# Patient Record
Sex: FEMALE | Race: ASIAN | NOT HISPANIC OR LATINO | Employment: FULL TIME | URBAN - METROPOLITAN AREA
[De-identification: names, ages, dates, MRNs, and addresses within clinical notes are randomized per-mention and may not be internally consistent; named-entity substitution may affect disease eponyms.]

---

## 2020-12-16 ENCOUNTER — TRANSFERRED RECORDS (OUTPATIENT)
Dept: MULTI SPECIALTY CLINIC | Facility: CLINIC | Age: 40
End: 2020-12-16

## 2020-12-16 LAB
HPV ABSTRACT: ABNORMAL
PAP-ABSTRACT: NORMAL

## 2021-05-11 ENCOUNTER — OFFICE VISIT (OUTPATIENT)
Dept: OBGYN | Facility: CLINIC | Age: 41
End: 2021-05-11
Payer: COMMERCIAL

## 2021-05-11 VITALS
OXYGEN SATURATION: 98 % | DIASTOLIC BLOOD PRESSURE: 69 MMHG | WEIGHT: 122 LBS | HEART RATE: 74 BPM | BODY MASS INDEX: 20.83 KG/M2 | SYSTOLIC BLOOD PRESSURE: 117 MMHG | HEIGHT: 64 IN

## 2021-05-11 DIAGNOSIS — N80.9 ENDOMETRIOSIS: Primary | ICD-10-CM

## 2021-05-11 DIAGNOSIS — R87.810 CERVICAL HIGH RISK HPV (HUMAN PAPILLOMAVIRUS) TEST POSITIVE: ICD-10-CM

## 2021-05-11 PROCEDURE — 99204 OFFICE O/P NEW MOD 45 MIN: CPT | Performed by: OBSTETRICS & GYNECOLOGY

## 2021-05-11 RX ORDER — LEVONORGESTREL AND ETHINYL ESTRADIOL 0.15-0.03
1 KIT ORAL DAILY
Qty: 84 TABLET | Refills: 0 | Status: SHIPPED | OUTPATIENT
Start: 2021-05-11 | End: 2021-12-21

## 2021-05-11 RX ORDER — CALCIPOTRIENE 0.05 MG/ML
SOLUTION TOPICAL PRN
COMMUNITY
Start: 2020-06-04

## 2021-05-11 RX ORDER — DESOGESTREL AND ETHINYL ESTRADIOL 0.15-0.03
1 KIT ORAL DAILY
COMMUNITY
Start: 2021-04-26 | End: 2022-07-23

## 2021-05-11 RX ORDER — CLOBETASOL PROPIONATE 0.5 MG/G
OINTMENT TOPICAL PRN
COMMUNITY
Start: 2020-10-01

## 2021-05-11 RX ORDER — CLOBETASOL PROPIONATE 0.5 MG/ML
SOLUTION TOPICAL
COMMUNITY
Start: 2020-11-23

## 2021-05-11 RX ORDER — CALCIPOTRIENE 0.05 MG/ML
SOLUTION TOPICAL PRN
COMMUNITY
Start: 2020-10-15

## 2021-05-11 SDOH — ECONOMIC STABILITY: TRANSPORTATION INSECURITY
IN THE PAST 12 MONTHS, HAS THE LACK OF TRANSPORTATION KEPT YOU FROM MEDICAL APPOINTMENTS OR FROM GETTING MEDICATIONS?: NOT ASKED

## 2021-05-11 SDOH — HEALTH STABILITY: MENTAL HEALTH: HOW OFTEN DO YOU HAVE A DRINK CONTAINING ALCOHOL?: MONTHLY OR LESS

## 2021-05-11 SDOH — HEALTH STABILITY: MENTAL HEALTH: HOW MANY STANDARD DRINKS CONTAINING ALCOHOL DO YOU HAVE ON A TYPICAL DAY?: NOT ASKED

## 2021-05-11 SDOH — ECONOMIC STABILITY: TRANSPORTATION INSECURITY
IN THE PAST 12 MONTHS, HAS LACK OF TRANSPORTATION KEPT YOU FROM MEETINGS, WORK, OR FROM GETTING THINGS NEEDED FOR DAILY LIVING?: NOT ASKED

## 2021-05-11 SDOH — ECONOMIC STABILITY: FOOD INSECURITY: WITHIN THE PAST 12 MONTHS, THE FOOD YOU BOUGHT JUST DIDN'T LAST AND YOU DIDN'T HAVE MONEY TO GET MORE.: NOT ASKED

## 2021-05-11 SDOH — ECONOMIC STABILITY: FOOD INSECURITY: WITHIN THE PAST 12 MONTHS, YOU WORRIED THAT YOUR FOOD WOULD RUN OUT BEFORE YOU GOT MONEY TO BUY MORE.: NOT ASKED

## 2021-05-11 SDOH — ECONOMIC STABILITY: INCOME INSECURITY: HOW HARD IS IT FOR YOU TO PAY FOR THE VERY BASICS LIKE FOOD, HOUSING, MEDICAL CARE, AND HEATING?: NOT ASKED

## 2021-05-11 SDOH — HEALTH STABILITY: MENTAL HEALTH: HOW OFTEN DO YOU HAVE 6 OR MORE DRINKS ON ONE OCCASION?: NEVER

## 2021-05-11 ASSESSMENT — MIFFLIN-ST. JEOR: SCORE: 1218.39

## 2021-05-11 NOTE — PATIENT INSTRUCTIONS
Take new OCP the regular way starting this Sunday and should only bleed on placebo pill week.  Then would switch to continuous therapy.    Get colonoscopy done.

## 2021-05-11 NOTE — Clinical Note
Please abstract the following data from this visit with this patient into the appropriate field in Epic:    Tests that can be patient reported without a hard copy:    Pap smear done on this date: 12/16/20 (approximately), by this group: [facility], results were normal.     Other Tests found in the patient's chart through Chart Review/Care Everywhere:    {Abstract Quality List (Optional):235283}    Note to Abstraction: If this section is blank, no results were found via Chart Review/Care Everywhere.

## 2021-05-12 NOTE — PROGRESS NOTES
Birth control options  HPI:  Alison Smith is a 40 year old female is a   P0.  Patient's last menstrual period was 04/26/2021.  Needs to switch to [place] from health [facility] and all records are reviewed at today's visit in care everywhere with the patient    Patients records are available and reviewed at today's visit.    In December 2020 had Pap smear that was Nil with other high risk HPV.  She has received the HPV vaccine so discussed the type of HPV that she had.  She did undergo colposcopy in January with benign cervical polyp removed and a posterior vaginal wall biopsy consistent with endometriosis.  Since the biopsy has been done, she feels like there is more pain on her left side that sometimes radiates into her left leg.  She especially notes increased rectal pain on her left side with her menses starting couple days prior and lasting throughout bleeding.  [place] can also be painful.    She had an MRI that was ordered and results are reviewed in detail with the patient at today's visit.  Small 2 cm endometrioma seen on ovary with pelvic mass located in the pouch of Sebastian appearing consistent with endometriosis but cannot rule out rectal cancer so colonoscopy was recommended.    Past GYN history:  Chlamydia and HPV       Last PAP smear:  Normal, HPV other hr +    Past Medical History:   Diagnosis Date     Cervical high risk HPV (human papillomavirus) test positive      Gastroesophageal reflux disease without esophagitis        Past Surgical History:   Procedure Laterality Date     DENTAL SURGERY      wisdom teeth     EYE SURGERY Right 1992       Family History   Problem Relation Age of Onset     GERD Mother      Coronary Artery Disease Father         heart attack     Hyperlipidemia Father      Hyperlipidemia Sister      Hyperlipidemia Brother        Social History     Socioeconomic History     Marital status: Single     Spouse name: None     Number of children: None     Years of education: None      "Highest education level: None   Occupational History     None   Social Needs     Financial resource strain: None     Food insecurity     Worry: None     Inability: None     Transportation needs     Medical: None     Non-medical: None   Tobacco Use     Smoking status: Never Smoker     Smokeless tobacco: Never Used   Substance and Sexual Activity     Alcohol use: Yes     Frequency: Monthly or less     Binge frequency: Never     Drug use: None     Sexual activity: Yes     Partners: Male     Birth control/protection: Pill   Lifestyle     Physical activity     Days per week: None     Minutes per session: None     Stress: None   Relationships     Social connections     Talks on phone: None     Gets together: None     Attends Jehovah's witness service: None     Active member of club or organization: None     Attends meetings of clubs or organizations: None     Relationship status: None     Intimate partner violence     Fear of current or ex partner: None     Emotionally abused: None     Physically abused: None     Forced sexual activity: None   Other Topics Concern     None   Social History Narrative     None       Allergies: Patient has no known allergies.    Current Outpatient Medications   Medication Sig Dispense Refill     calcipotriene (DOVONOX) 0.005 % external solution Apply topically as needed       levonorgestrel-ethinyl estradiol (SEASONALE) 0.15-0.03 MG tablet Take 1 tablet by mouth daily 84 tablet 0     omeprazole (PRILOSEC) 20 MG DR capsule TAKE 1 CAPSULE BY MOUTH EVERY DAY       calcipotriene (DOVONOX) 0.005 % external solution Apply topically as needed       clobetasol (TEMOVATE) 0.05 % external ointment as needed       clobetasol (TEMOVATE) 0.05 % external solution APPL YTO SKIN ON SCALP NIGHTLY. ROTATE WITH CALCIPOTRIENE       ISIBLOOM 0.15-30 MG-MCG tablet Take 1 tablet by mouth daily        EXAM:  Blood pressure 117/69, pulse 74, height 1.626 m (5' 4\"), weight 55.3 kg (122 lb), last menstrual period 04/26/2021, " SpO2 98 %.  BMI= Body mass index is 20.94 kg/m .  Patient's last menstrual period was 04/26/2021.  General - pleasant female in no acute distress.  Neurological - alert and oriented X 3  Psychiatric - normal mood and affect    prep time day of service 5 min  visit time with the patient 30 min  post visit work including documentation time 12 min  Total time: 47 min      ASSESSMENT/PLAN:  (N80.9) Endometriosis  (primary encounter diagnosis)  Comment: Severe per MRI  Plan: levonorgestrel-ethinyl estradiol (SEASONALE)         0.15-0.03 MG tablet, GASTROENTEROLOGY ADULT REF        PROCEDURE ONLY        She states the pain is tolerable except during her menses.  As she continues to have breakthrough bleeding on this current OCP, will switch to a different pill to see if it will stabilize the endometrium.  Discussed if we can find a birth control pill where she does not have breakthrough bleeding, she can do continuous therapy and this will help decrease the amount of pain she has.  Discussed option of Elaglix if needed but she would decline this for now.    Reviewed she really does need to have colonoscopy done to confirm endometriosis and not something else.  She needs to figure out a ride home and colonoscopy was ordered.  Discussed she may have more pain after colon Biopsy is also done due to inflammation.  Reviewed the small endometrioma seen on her ovary and would prefer no surgery if she can get into menopause.  General questions were answered and she will let me know if this birth control pill does not stop her breakthrough bleeding.    Discussed repeat Pap smear with HPV testing in 1 year.      NO TALBOT MD

## 2021-05-17 ENCOUNTER — TELEPHONE (OUTPATIENT)
Dept: FAMILY MEDICINE | Facility: CLINIC | Age: 41
End: 2021-05-17

## 2021-05-17 DIAGNOSIS — N80.9 ENDOMETRIOSIS: Primary | ICD-10-CM

## 2021-05-17 RX ORDER — LEVONORGESTREL AND ETHINYL ESTRADIOL 0.15-0.03
1 KIT ORAL DAILY
Qty: 84 TABLET | Refills: 3 | Status: SHIPPED | OUTPATIENT
Start: 2021-05-17 | End: 2021-12-21

## 2021-05-17 NOTE — TELEPHONE ENCOUNTER
Dr. Pitt-Please review and advise/sign if agree.  Please route encounter to appropriate care team pool.  [facility]    Call received from patient:  1. Requesting medication change from Seasonale to Kurvelo   A. Seasonale comes in a #91 pack and patient would like monthly dispense.  Kurvelo available in a monthly dispense    Pharmacy pended.    Patient gave permission to leave detailed message on voicemail.    Thank you!  JHON MichaelN, RN  [facility]

## 2021-05-24 ENCOUNTER — TELEPHONE (OUTPATIENT)
Dept: GASTROENTEROLOGY | Facility: CLINIC | Age: 41
End: 2021-05-24

## 2021-05-24 DIAGNOSIS — Z11.59 ENCOUNTER FOR SCREENING FOR OTHER VIRAL DISEASES: ICD-10-CM

## 2021-05-24 NOTE — TELEPHONE ENCOUNTER
Screening Questions  1. What insurance is in the chart? [facility]    2.  Ordering/Referring Provider:Gissel Pitt MD    3. BMI 20.6    4. Are you on daily home oxygen? no    5. Do you have a history of difficult airway? no    6. Have you had a heart, lung, or liver transplant? nno    7. Are you currently on dialysis? no    8. Have you had a stroke or Transient ischemic atttack (TIA) within 6 months? no    9. In the past 6 months, have you had any heart related issues including cardiomyopathy or heart attack?         If yes, did it require cardiac stenting or other implantable device?no    10. Do you have any implantable devices in your body (pacemaker, defib, LVAD)? no    11. Do you take nitroglycerin? If yes, how often? no    12. Are you currently taking any blood thinners?no    13. Are you a diabetic? no    14. (Females) Are you currently pregnant? no  If yes, how many weeks?    15. Have you had a procedure in the past that was difficult to tolerate with conscious sedation? Any allergies to Fentanyl or Versed no    16. Are you taking any scheduled prescription narcotics more than once daily? no    17. Do you have any chemical dependencies such as alcohol, street drugs, or methadone? no    18. Do you have any history of post-traumatic stress syndrome or mental health issues? no    19. Do you transfer independently? yes    20.  Do you have any issues with constipation? no    21. Preferred Pharmacy for Pre Prescription     Scheduling Details    Procedure Scheduled: Colonoscopy  Provider/Surgeon: Dr. LOPEZ  Date of Procedure: 6/19/21  Location: [facility]  Caller (Please ask for phone number if not scheduled by patient):       Sedation Type: CS  Conscious Sedation- Needs  for 6 hours after the procedure  MAC/General-Needs  for 24 hours after procedure    Pre-op Required at [facility] and OR for  MAC sedation:   (if yes advise patient they will need a pre-op prior to procedure)      Is patient on  blood thinners? -NO (If yes- inform patient to follow up with PCP or provider for follow up instructions)     Informed patient they will need an adult  YES  Cannot take any type of public or medical transportation alone    Informed Patient of COVID Test Requirement YES    Confirmed Nurse will call to complete assessment YES    Additional comments:

## 2021-06-04 ENCOUNTER — TELEPHONE (OUTPATIENT)
Dept: GASTROENTEROLOGY | Facility: OUTPATIENT CENTER | Age: 41
End: 2021-06-04

## 2021-06-08 ENCOUNTER — TELEPHONE (OUTPATIENT)
Dept: GASTROENTEROLOGY | Facility: OUTPATIENT CENTER | Age: 41
End: 2021-06-08

## 2021-06-08 ENCOUNTER — MYC MEDICAL ADVICE (OUTPATIENT)
Dept: OBGYN | Facility: CLINIC | Age: 41
End: 2021-06-08

## 2021-06-09 ENCOUNTER — TELEPHONE (OUTPATIENT)
Dept: GASTROENTEROLOGY | Facility: OUTPATIENT CENTER | Age: 41
End: 2021-06-09

## 2021-06-09 NOTE — TELEPHONE ENCOUNTER
Is patient taking blood thinners/antiplatelet medications? No     Heart Disease? denies    Lung Disease? denies    Sleep Apnea? denies    Diabetic? denies    Kidney Disease? denies    Electronic Implantable Devices? denies    PTSD? N/a    Prep instructions reviewed with patient? Instructions,  policy, MAC sedation plan reviewed. Instructed patient to have someone stay with her for 24 hours post exam    : Yes    Pharmacy: N/a    Indication for Procedure: Endometriosis    Referring Provider: Gissel Pitt MD    Arrival Time: 7:30 AM    COVID test? 6-14 [facility]    Yue Weinberg RN    Patient states she is not pregnant or lactating

## 2021-06-14 DIAGNOSIS — Z11.59 ENCOUNTER FOR SCREENING FOR OTHER VIRAL DISEASES: ICD-10-CM

## 2021-06-14 LAB
LABORATORY COMMENT REPORT: NORMAL
SARS-COV-2 RNA RESP QL NAA+PROBE: NEGATIVE
SARS-COV-2 RNA RESP QL NAA+PROBE: NORMAL
SPECIMEN SOURCE: NORMAL
SPECIMEN SOURCE: NORMAL

## 2021-06-14 PROCEDURE — U0003 INFECTIOUS AGENT DETECTION BY NUCLEIC ACID (DNA OR RNA); SEVERE ACUTE RESPIRATORY SYNDROME CORONAVIRUS 2 (SARS-COV-2) (CORONAVIRUS DISEASE [COVID-19]), AMPLIFIED PROBE TECHNIQUE, MAKING USE OF HIGH THROUGHPUT TECHNOLOGIES AS DESCRIBED BY CMS-2020-01-R: HCPCS | Performed by: COLON & RECTAL SURGERY

## 2021-06-14 PROCEDURE — U0005 INFEC AGEN DETEC AMPLI PROBE: HCPCS | Performed by: COLON & RECTAL SURGERY

## 2021-06-16 ENCOUNTER — DOCUMENTATION ONLY (OUTPATIENT)
Dept: GASTROENTEROLOGY | Facility: OUTPATIENT CENTER | Age: 41
End: 2021-06-16
Payer: COMMERCIAL

## 2021-06-16 ENCOUNTER — TRANSFERRED RECORDS (OUTPATIENT)
Dept: HEALTH INFORMATION MANAGEMENT | Facility: CLINIC | Age: 41
End: 2021-06-16

## 2021-06-27 ENCOUNTER — HEALTH MAINTENANCE LETTER (OUTPATIENT)
Age: 41
End: 2021-06-27

## 2021-06-28 ENCOUNTER — TELEPHONE (OUTPATIENT)
Dept: SURGERY | Facility: CLINIC | Age: 41
End: 2021-06-28

## 2021-06-28 NOTE — TELEPHONE ENCOUNTER
LVM x1 stating she can call [facility] to get images of colonoscopy.

## 2021-06-28 NOTE — TELEPHONE ENCOUNTER
[facility] Call Center    Phone Message    May a detailed message be left on voicemail: yes     Reason for Call: Other: Per pt is wondering if she can get access to the video or pictures of her colonoscopy that was done back on 06/16 with Dr. Kelly. Please call pt back to let her know if she can or cannot. Thank you.      Action Taken: Message routed to:  [facility] ([facility] Colon and Rec      Travel Screening: Not Applicable

## 2021-08-04 ENCOUNTER — MYC MEDICAL ADVICE (OUTPATIENT)
Dept: OBGYN | Facility: CLINIC | Age: 41
End: 2021-08-04

## 2021-08-04 DIAGNOSIS — Z30.011 ORAL CONTRACEPTIVE PRESCRIBED: Primary | ICD-10-CM

## 2021-08-04 RX ORDER — NORGESTIMATE AND ETHINYL ESTRADIOL 0.25-0.035
1 KIT ORAL DAILY
Qty: 84 TABLET | Refills: 3 | Status: SHIPPED | OUTPATIENT
Start: 2021-08-04 | End: 2021-12-21

## 2021-10-17 ENCOUNTER — HEALTH MAINTENANCE LETTER (OUTPATIENT)
Age: 41
End: 2021-10-17

## 2021-11-03 ENCOUNTER — HOSPITAL ENCOUNTER (OUTPATIENT)
Dept: ULTRASOUND IMAGING | Facility: CLINIC | Age: 41
Discharge: HOME | End: 2021-11-03
Attending: OBSTETRICS & GYNECOLOGY | Admitting: OBSTETRICS & GYNECOLOGY
Payer: COMMERCIAL

## 2021-11-03 DIAGNOSIS — N80.9 ENDOMETRIOSIS: ICD-10-CM

## 2021-11-03 PROCEDURE — 76830 TRANSVAGINAL US NON-OB: CPT | Mod: 26 | Performed by: RADIOLOGY

## 2021-11-03 PROCEDURE — 76856 US EXAM PELVIC COMPLETE: CPT

## 2021-11-03 PROCEDURE — 76856 US EXAM PELVIC COMPLETE: CPT | Mod: 26 | Performed by: RADIOLOGY

## 2021-11-03 PROCEDURE — 76830 TRANSVAGINAL US NON-OB: CPT

## 2021-12-21 ENCOUNTER — OFFICE VISIT (OUTPATIENT)
Dept: OBGYN | Facility: CLINIC | Age: 41
End: 2021-12-21
Payer: COMMERCIAL

## 2021-12-21 VITALS
DIASTOLIC BLOOD PRESSURE: 80 MMHG | WEIGHT: 132 LBS | HEIGHT: 64 IN | BODY MASS INDEX: 22.53 KG/M2 | SYSTOLIC BLOOD PRESSURE: 120 MMHG

## 2021-12-21 DIAGNOSIS — Z13.1 SCREENING FOR DIABETES MELLITUS: ICD-10-CM

## 2021-12-21 DIAGNOSIS — Z13.0 SCREENING, ANEMIA, DEFICIENCY, IRON: ICD-10-CM

## 2021-12-21 DIAGNOSIS — Z13.29 SCREENING FOR THYROID DISORDER: ICD-10-CM

## 2021-12-21 DIAGNOSIS — Z01.419 ENCOUNTER FOR GYNECOLOGICAL EXAMINATION WITHOUT ABNORMAL FINDING: Primary | ICD-10-CM

## 2021-12-21 DIAGNOSIS — Z13.220 SCREENING FOR LIPID DISORDERS: ICD-10-CM

## 2021-12-21 DIAGNOSIS — Z11.3 ROUTINE SCREENING FOR STI (SEXUALLY TRANSMITTED INFECTION): ICD-10-CM

## 2021-12-21 DIAGNOSIS — N80.9 ENDOMETRIOSIS: ICD-10-CM

## 2021-12-21 DIAGNOSIS — Z12.4 PAP SMEAR FOR CERVICAL CANCER SCREENING: ICD-10-CM

## 2021-12-21 DIAGNOSIS — R87.810 CERVICAL HIGH RISK HPV (HUMAN PAPILLOMAVIRUS) TEST POSITIVE: ICD-10-CM

## 2021-12-21 LAB
CLUE CELLS: ABNORMAL
ERYTHROCYTE [DISTWIDTH] IN BLOOD BY AUTOMATED COUNT: 12.3 % (ref 10–15)
HBA1C MFR BLD: 5.3 % (ref 0–5.6)
HBV SURFACE AG SERPL QL IA: NONREACTIVE
HCT VFR BLD AUTO: 42.7 % (ref 35–47)
HCV AB SERPL QL IA: NONREACTIVE
HGB BLD-MCNC: 14 G/DL (ref 11.7–15.7)
HIV 1+2 AB+HIV1 P24 AG SERPL QL IA: NONREACTIVE
MCH RBC QN AUTO: 27.7 PG (ref 26.5–33)
MCHC RBC AUTO-ENTMCNC: 32.8 G/DL (ref 31.5–36.5)
MCV RBC AUTO: 84 FL (ref 78–100)
PLATELET # BLD AUTO: 342 10E3/UL (ref 150–450)
RBC # BLD AUTO: 5.06 10E6/UL (ref 3.8–5.2)
TRICHOMONAS, WET PREP: ABNORMAL
WBC # BLD AUTO: 6.9 10E3/UL (ref 4–11)
WBC'S/HIGH POWER FIELD, WET PREP: ABNORMAL
YEAST, WET PREP: ABNORMAL

## 2021-12-21 PROCEDURE — 87210 SMEAR WET MOUNT SALINE/INK: CPT | Performed by: OBSTETRICS & GYNECOLOGY

## 2021-12-21 PROCEDURE — 87340 HEPATITIS B SURFACE AG IA: CPT | Performed by: OBSTETRICS & GYNECOLOGY

## 2021-12-21 PROCEDURE — 88175 CYTOPATH C/V AUTO FLUID REDO: CPT | Performed by: OBSTETRICS & GYNECOLOGY

## 2021-12-21 PROCEDURE — 36415 COLL VENOUS BLD VENIPUNCTURE: CPT | Performed by: OBSTETRICS & GYNECOLOGY

## 2021-12-21 PROCEDURE — 86803 HEPATITIS C AB TEST: CPT | Performed by: OBSTETRICS & GYNECOLOGY

## 2021-12-21 PROCEDURE — 85027 COMPLETE CBC AUTOMATED: CPT | Performed by: OBSTETRICS & GYNECOLOGY

## 2021-12-21 PROCEDURE — 87591 N.GONORRHOEAE DNA AMP PROB: CPT | Performed by: OBSTETRICS & GYNECOLOGY

## 2021-12-21 PROCEDURE — 84443 ASSAY THYROID STIM HORMONE: CPT | Performed by: OBSTETRICS & GYNECOLOGY

## 2021-12-21 PROCEDURE — 86780 TREPONEMA PALLIDUM: CPT | Performed by: OBSTETRICS & GYNECOLOGY

## 2021-12-21 PROCEDURE — 87624 HPV HI-RISK TYP POOLED RSLT: CPT | Performed by: OBSTETRICS & GYNECOLOGY

## 2021-12-21 PROCEDURE — 87491 CHLMYD TRACH DNA AMP PROBE: CPT | Performed by: OBSTETRICS & GYNECOLOGY

## 2021-12-21 PROCEDURE — 83036 HEMOGLOBIN GLYCOSYLATED A1C: CPT | Performed by: OBSTETRICS & GYNECOLOGY

## 2021-12-21 PROCEDURE — 99395 PREV VISIT EST AGE 18-39: CPT | Performed by: OBSTETRICS & GYNECOLOGY

## 2021-12-21 PROCEDURE — 87389 HIV-1 AG W/HIV-1&-2 AB AG IA: CPT | Performed by: OBSTETRICS & GYNECOLOGY

## 2021-12-21 RX ORDER — NORGESTIMATE AND ETHINYL ESTRADIOL 0.25-0.035
1 KIT ORAL DAILY
Qty: 112 TABLET | Refills: 4 | Status: SHIPPED | OUTPATIENT
Start: 2021-12-21 | End: 2023-01-11

## 2021-12-21 ASSESSMENT — MIFFLIN-ST. JEOR: SCORE: 1258.75

## 2021-12-21 NOTE — PROGRESS NOTES
Alison is a 41 year old  female who presents for annual exam.     Menses are regular q 28-30 days   Patient's last menstrual period was 2021.. Using oral contraceptives for contraception.  She is not currently considering pregnancy.  Besides routine health maintenance, she has no other health concerns today .  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  Was having more cramping when up on her feet.  Does feel like the iron tablets and the birth control pack were helping with symptoms.  Feels better on the birth control pill.  GYNECOLOGIC HISTORY:  Menarche:   Alison is sexually active with male partner(s) and is currently in monogamous relationship   History sexually transmitted infections:Chlamydia  STI testing offered?  Accepted  CORBIN exposure: Unknown  History of abnormal Pap smear: NO - age 30-65 PAP every 5 years with negative HPV co-testing recommended  Family history of breast CA: No  Family history of uterine/ovarian CA: No    Family history of colon CA: No    HEALTH MAINTENANCE:  Cholesterol: (No results found for: CHOL History of abnormal lipids: No  Mammo: na . History of abnormal Mammo: No.  Regular Self Breast Exams: No  Calcium/Vitamin D intake: source:  dairy Adequate? Yes  TSH: (No results found for: TSH )  Pap; (No results found for: PAP )    HISTORY:  OB History    Para Term  AB Living   0 0 0 0 0 0   SAB IAB Ectopic Multiple Live Births   0 0 0 0 0     Past Medical History:   Diagnosis Date     Cervical high risk HPV (human papillomavirus) test positive      Gastroesophageal reflux disease without esophagitis      Past Surgical History:   Procedure Laterality Date     DENTAL SURGERY      wisdom teeth     EYE SURGERY Right      Family History   Problem Relation Age of Onset     GERD Mother      Coronary Artery Disease Father         heart attack     Hyperlipidemia Father      Hyperlipidemia Sister       "Hyperlipidemia Brother      Social History     Socioeconomic History     Marital status: Single     Spouse name: Not on file     Number of children: Not on file     Years of education: Not on file     Highest education level: Not on file   Occupational History     Occupation: pharmacy tech   Tobacco Use     Smoking status: Never Smoker     Smokeless tobacco: Never Used   Substance and Sexual Activity     Alcohol use: Yes     Drug use: Not on file     Sexual activity: Yes     Partners: Male     Birth control/protection: Pill   Other Topics Concern     Not on file   Social History Narrative     Not on file     Social Determinants of Health     Financial Resource Strain: Not on file   Food Insecurity: Not on file   Transportation Needs: Not on file   Physical Activity: Not on file   Stress: Not on file   Social Connections: Not on file   Intimate Partner Violence: Not on file   Housing Stability: Not on file       Current Outpatient Medications:      norgestimate-ethinyl estradiol (ORTHO-CYCLEN) 0.25-35 MG-MCG tablet, Take 1 tablet by mouth daily Active tablets only for prevention of menses, Disp: 112 tablet, Rfl: 4     calcipotriene (DOVONOX) 0.005 % external solution, Apply topically as needed, Disp: , Rfl:      calcipotriene (DOVONOX) 0.005 % external solution, Apply topically as needed, Disp: , Rfl:      clobetasol (TEMOVATE) 0.05 % external ointment, as needed, Disp: , Rfl:      clobetasol (TEMOVATE) 0.05 % external solution, APPL YTO SKIN ON SCALP NIGHTLY. ROTATE WITH CALCIPOTRIENE, Disp: , Rfl:      ISIBLOOM 0.15-30 MG-MCG tablet, Take 1 tablet by mouth daily, Disp: , Rfl:      omeprazole (PRILOSEC) 20 MG DR capsule, TAKE 1 CAPSULE BY MOUTH EVERY DAY, Disp: , Rfl:    No Known Allergies    Past medical, surgical, social and family history were reviewed and updated in EPIC.      EXAM:  /80   Ht 1.626 m (5' 4\")   Wt 59.9 kg (132 lb)   LMP 12/08/2021   BMI 22.66 kg/m     BMI: Body mass index is 22.66 " kg/m .  Constitutional: healthy, alert and no distress  Head: Normocephalic. No masses, lesions, tenderness or abnormalities  Neck: Neck supple. Trachea midline. No adenopathy. Thyroid symmetric, normal size.   Cardiovascular: RRR.   Respiratory: Negative.   Breast: No nodularity, asymmetry or nipple discharge bilaterally.  Gastrointestinal: Abdomen soft, non-tender, non-distended. No masses, organomegaly.  :  Vulva:  No external lesions, normal female hair distribution, no inguinal adenopathy.    Urethra:  Midline, non-tender, well supported, no discharge  Vagina:  Moist, pink, no abnormal discharge, no lesions  Uterus:  Normal size , non-tender  Ovaries:  No masses appreciated  Rectal Exam: deferred  Musculoskeletal: extremities normal  Skin: no suspicious lesions or rashes  Psychiatric: Affect appropriate, cooperative,mentation appears normal.     COUNSELING:   Reviewed preventive health counseling, as reflected in patient instructions       Contraception   reports that she has never smoked. She has never used smokeless tobacco.    Body mass index is 22.66 kg/m .    FRAX Risk Assessment    ASSESSMENT:  41 year old female with satisfactory annual exam  (Z01.419) Encounter for gynecological examination without abnormal finding  (primary encounter diagnosis)  Comment: OCP  Plan: norgestimate-ethinyl estradiol (ORTHO-CYCLEN)         0.25-35 MG-MCG tablet        Refill of OCP was done and continuous use discussed.  She has been taking a week off each month    (N80.9) Endometriosis  Comment: Better control  Plan: norgestimate-ethinyl estradiol (ORTHO-CYCLEN)         0.25-35 MG-MCG tablet        Continue OCP    (Z13.0) Screening, anemia, deficiency, iron  Plan: CBC with platelets        Return to clinic for lab    (Z13.220) Screening for lipid disorders  Plan: Lipid panel reflex to direct LDL Fasting        Return to clinic fasting for lab    (Z13.1) Screening for diabetes mellitus  Plan: Comprehensive metabolic  panel, Hemoglobin A1c        Return to clinic fasting for lab    (Z13.29) Screening for thyroid disorder  Plan: TSH with free T4 reflex        Return to clinic for lab    (Z11.3) Routine screening for STI (sexually transmitted infection)  Comment: Patient request  Plan: Wet preparation, NEISSERIA GONORRHOEA PCR,         CHLAMYDIA TRACHOMATIS PCR, Treponema Abs w         Reflex to RPR and Titer, Hepatitis C antibody,         HIV Antigen Antibody Combo, Hepatitis B surface        antigen        Few done today otherwise will return to clinic for lab    (R87.810) Cervical high risk HPV (human papillomavirus) test positive  Comment: Last Pap Nil with other high risk HPV (in Care Everywhere)  Plan: Pap imaged thin layer diagnostic with HPV         (select HPV order below), HPV High Risk Types         DNA Cervical        Repeat Pap smear was done today and discussed may need colposcopy again.      Gissel Pitt MD

## 2021-12-21 NOTE — PATIENT INSTRUCTIONS
.There are 2 ways to take the pill/patch/ring continuously:  1) take active tablets for 3 weeks, or patches for 3 weeks or ring for 3 weeks, then instead of one week off do another 1-2 months of active pill/patch or ring.   (ie you will be on active hormones for 6 or 9 weeks and then do one week off so skipping periods)     2) you can take active tablets/patch or ring until you have red bleeding, then take 4-7 days off of hormone and restart.   Bleeding will happen at random times.    You never want to go more than 7 days off a hormonal therapy or you can get pregnant, so just make sure NO MORE THAN 7 DAYS OFF.  :)    Make lab only appointment fasting (nothing but water 10 hours prior to blood draw) at any [facility].

## 2021-12-22 LAB
C TRACH DNA SPEC QL NAA+PROBE: NEGATIVE
N GONORRHOEA DNA SPEC QL NAA+PROBE: NEGATIVE
T PALLIDUM AB SER QL: NONREACTIVE
TSH SERPL DL<=0.005 MIU/L-ACNC: 1.26 MU/L (ref 0.4–4)

## 2021-12-24 LAB
BKR LAB AP GYN ADEQUACY: NORMAL
BKR LAB AP GYN INTERPRETATION: NORMAL
BKR LAB AP HPV REFLEX: NORMAL
BKR LAB AP PREVIOUS ABNORMAL: NORMAL
PATH REPORT.COMMENTS IMP SPEC: NORMAL
PATH REPORT.COMMENTS IMP SPEC: NORMAL
PATH REPORT.RELEVANT HX SPEC: NORMAL

## 2021-12-28 ENCOUNTER — PATIENT OUTREACH (OUTPATIENT)
Dept: OBGYN | Facility: CLINIC | Age: 41
End: 2021-12-28
Payer: COMMERCIAL

## 2021-12-28 LAB
HUMAN PAPILLOMA VIRUS 16 DNA: NEGATIVE
HUMAN PAPILLOMA VIRUS 18 DNA: NEGATIVE
HUMAN PAPILLOMA VIRUS FINAL DIAGNOSIS: ABNORMAL
HUMAN PAPILLOMA VIRUS OTHER HR: POSITIVE

## 2022-03-28 ENCOUNTER — OFFICE VISIT (OUTPATIENT)
Dept: OBGYN | Facility: CLINIC | Age: 42
End: 2022-03-28
Payer: COMMERCIAL

## 2022-03-28 VITALS
BODY MASS INDEX: 21.61 KG/M2 | HEART RATE: 88 BPM | SYSTOLIC BLOOD PRESSURE: 117 MMHG | DIASTOLIC BLOOD PRESSURE: 72 MMHG | WEIGHT: 126.6 LBS | HEIGHT: 64 IN | OXYGEN SATURATION: 99 %

## 2022-03-28 DIAGNOSIS — R87.810 CERVICAL HIGH RISK HPV (HUMAN PAPILLOMAVIRUS) TEST POSITIVE: Primary | ICD-10-CM

## 2022-03-28 LAB — HCG UR QL: NEGATIVE

## 2022-03-28 PROCEDURE — 81025 URINE PREGNANCY TEST: CPT | Performed by: OBSTETRICS & GYNECOLOGY

## 2022-03-28 PROCEDURE — 57452 EXAM OF CERVIX W/SCOPE: CPT | Performed by: OBSTETRICS & GYNECOLOGY

## 2022-03-28 NOTE — PROGRESS NOTES
Alison Smith is a 41 year old year old female  who presents for initial colposcopy, referred. Pap smear on 21 showed: normal and with high risk HPV present: other. The prior pap showed normal with other high risk HPV.     No LMP recorded.  UPT today is negative  Patient does not smoke  Type of contraception: oral contraceptive  Age at first sexual intercourse:   Number of sexual partners (lifetime): less than 6  Past GYN history: Chlamydia and HPV  Prior cervical/vaginal disease: none.  Prior cervical treatment: no treatment.      PROCEDURE:    Before the procedure, it was ensured that the patient was educated regarding the nature of her findings to date, the implications, and what was to be done. She has been made aware of the role of HPV, the natural history of infection, ways to minimize her future risk, the effect of HPV on the cervix, and treatment options available should they be indicated. The details of the colposcopic procedure were reviewed. All questions were answered before proceeding, and informed consent was therefore obtained.      Speculum placed in vagina and excellent visualization of cervix acheived, cervix swabbed x 3 with acetic acid solution.      FINDINGS:  Cervix: no visible lesions and no concerning findings  Please refer to images section for details.  SCJ seen?: yes   ECC done?: No  Satisfactory examination?: yes      ASSESSMENT: Normal exam without visible pathology.  PLAN: plan repeat pap smear with HPV in one year. Answered questions.     Gissel Pitt MD

## 2022-03-28 NOTE — PATIENT INSTRUCTIONS

## 2022-04-08 ENCOUNTER — PATIENT OUTREACH (OUTPATIENT)
Dept: OBGYN | Facility: CLINIC | Age: 42
End: 2022-04-08
Payer: COMMERCIAL

## 2022-04-08 NOTE — TELEPHONE ENCOUNTER
3/28/22 [place]: Normal exam without visible pathology. PLAN: plan repeat pap smear with HPV in one year.

## 2022-06-21 ENCOUNTER — E-VISIT (OUTPATIENT)
Dept: OBGYN | Facility: CLINIC | Age: 42
End: 2022-06-21
Payer: COMMERCIAL

## 2022-06-21 ENCOUNTER — MYC MEDICAL ADVICE (OUTPATIENT)
Dept: OBGYN | Facility: CLINIC | Age: 42
End: 2022-06-21
Payer: COMMERCIAL

## 2022-06-21 DIAGNOSIS — N80.9 ENDOMETRIOSIS: Primary | ICD-10-CM

## 2022-06-21 PROCEDURE — 99421 OL DIG E/M SVC 5-10 MIN: CPT | Performed by: OBSTETRICS & GYNECOLOGY

## 2022-07-20 ENCOUNTER — ANCILLARY PROCEDURE (OUTPATIENT)
Dept: MRI IMAGING | Facility: CLINIC | Age: 42
End: 2022-07-20
Attending: OBSTETRICS & GYNECOLOGY
Payer: COMMERCIAL

## 2022-07-20 DIAGNOSIS — N80.9 ENDOMETRIOSIS: ICD-10-CM

## 2022-07-20 PROCEDURE — 72197 MRI PELVIS W/O & W/DYE: CPT

## 2022-07-20 PROCEDURE — A9585 GADOBUTROL INJECTION: HCPCS | Performed by: OBSTETRICS & GYNECOLOGY

## 2022-07-20 PROCEDURE — 255N000002 HC RX 255 OP 636: Performed by: OBSTETRICS & GYNECOLOGY

## 2022-07-20 RX ORDER — GADOBUTROL 604.72 MG/ML
6 INJECTION INTRAVENOUS ONCE
Status: COMPLETED | OUTPATIENT
Start: 2022-07-20 | End: 2022-07-20

## 2022-07-20 RX ADMIN — GADOBUTROL 6 ML: 604.72 INJECTION INTRAVENOUS at 07:52

## 2022-10-02 ENCOUNTER — HEALTH MAINTENANCE LETTER (OUTPATIENT)
Age: 42
End: 2022-10-02

## 2023-01-09 DIAGNOSIS — N80.9 ENDOMETRIOSIS: ICD-10-CM

## 2023-01-09 DIAGNOSIS — Z01.419 ENCOUNTER FOR GYNECOLOGICAL EXAMINATION WITHOUT ABNORMAL FINDING: ICD-10-CM

## 2023-01-11 RX ORDER — NORGESTIMATE AND ETHINYL ESTRADIOL 0.25-0.035
KIT ORAL
Qty: 112 TABLET | Refills: 0 | Status: SHIPPED | OUTPATIENT
Start: 2023-01-11 | End: 2023-03-28

## 2023-01-11 NOTE — TELEPHONE ENCOUNTER
Prescription approved per [facility] Refill Protocol.    Kate Young, BSN RN  [facility]

## 2023-02-11 ENCOUNTER — HEALTH MAINTENANCE LETTER (OUTPATIENT)
Age: 43
End: 2023-02-11

## 2023-03-13 ENCOUNTER — PATIENT OUTREACH (OUTPATIENT)
Dept: OBGYN | Facility: CLINIC | Age: 43
End: 2023-03-13
Payer: COMMERCIAL

## 2023-03-13 DIAGNOSIS — Z01.419 ENCOUNTER FOR GYNECOLOGICAL EXAMINATION WITHOUT ABNORMAL FINDING: ICD-10-CM

## 2023-03-13 DIAGNOSIS — R87.810 CERVICAL HIGH RISK HPV (HUMAN PAPILLOMAVIRUS) TEST POSITIVE: ICD-10-CM

## 2023-03-13 DIAGNOSIS — N80.9 ENDOMETRIOSIS: ICD-10-CM

## 2023-03-13 NOTE — LETTER
March 13, 2023      Alison S Sarah  [address]  [place] [address]        Dear MsKristian,    This letter is to remind you that you are due for your follow-up Pap smear and Human Papillomavirus (HPV) test.    Please call [phone] to schedule your appointment at your earliest convenience.    If you have completed the appointment outside of the [facility] system, please have the records forwarded to our office. We will update your chart for your provider to review before your next annual wellness visit.     Thank you for choosing [facility]!      Sincerely,    Your [facility] Care Team

## 2023-03-16 RX ORDER — NORGESTIMATE AND ETHINYL ESTRADIOL 0.25-0.035
KIT ORAL
Qty: 112 TABLET | Refills: 0 | OUTPATIENT
Start: 2023-03-16

## 2023-03-16 NOTE — TELEPHONE ENCOUNTER
"Requested Prescriptions   Pending Prescriptions Disp Refills     ESTARYLLA 0.25-35 MG-MCG tablet [Pharmacy Med Name: ESTARYLLA TABLETS 28S] 112 tablet 0     Sig: TAKE 1 TABLET BY MOUTH EVERY DAY, ACTIVE TABLETS ONLY       Contraceptives Protocol Passed - 3/13/2023  3:05 PM        Passed - Patient is not a current smoker if age is 35 or older        Passed - Recent (12 mo) or future (30 days) visit within the authorizing provider's specialty     Patient has had an office visit with the authorizing provider or a provider within the authorizing providers department within the previous 12 mos or has a future within next 30 days. See \"Patient Info\" tab in inbasket, or \"Choose Columns\" in Meds & Orders section of the refill encounter.              Passed - Medication is active on med list        Passed - No active pregnancy on record        Passed - No positive pregnancy test in past 12 months           Last Written Prescription Date:  1/11/23  Last Fill Quantity: 112,  # refills: 0   Last office visit: 12/21/2021 with prescribing provider:  Vivian   Future Office Visit:  NONE    Pt due for annual, no appt scheduled. Pt already received one month extension. Rx denied.   Makenzie Martinez RN on 3/16/2023 at 5:47 AM      "

## 2023-04-27 ENCOUNTER — OFFICE VISIT (OUTPATIENT)
Dept: OBGYN | Facility: CLINIC | Age: 43
End: 2023-04-27
Payer: COMMERCIAL

## 2023-04-27 VITALS
HEIGHT: 64 IN | HEART RATE: 69 BPM | OXYGEN SATURATION: 99 % | DIASTOLIC BLOOD PRESSURE: 81 MMHG | SYSTOLIC BLOOD PRESSURE: 127 MMHG | WEIGHT: 122.7 LBS | BODY MASS INDEX: 20.95 KG/M2

## 2023-04-27 DIAGNOSIS — N80.9 ENDOMETRIOSIS: ICD-10-CM

## 2023-04-27 DIAGNOSIS — Z01.419 ENCOUNTER FOR GYNECOLOGICAL EXAMINATION WITHOUT ABNORMAL FINDING: Primary | ICD-10-CM

## 2023-04-27 DIAGNOSIS — Z13.220 SCREENING FOR LIPID DISORDERS: ICD-10-CM

## 2023-04-27 DIAGNOSIS — R87.810 CERVICAL HIGH RISK HPV (HUMAN PAPILLOMAVIRUS) TEST POSITIVE: ICD-10-CM

## 2023-04-27 PROCEDURE — 88175 CYTOPATH C/V AUTO FLUID REDO: CPT | Performed by: OBSTETRICS & GYNECOLOGY

## 2023-04-27 PROCEDURE — 99396 PREV VISIT EST AGE 40-64: CPT | Performed by: OBSTETRICS & GYNECOLOGY

## 2023-04-27 PROCEDURE — 87624 HPV HI-RISK TYP POOLED RSLT: CPT | Performed by: OBSTETRICS & GYNECOLOGY

## 2023-04-27 RX ORDER — NORGESTIMATE AND ETHINYL ESTRADIOL 0.25-0.035
KIT ORAL
Qty: 112 TABLET | Refills: 4 | Status: SHIPPED | OUTPATIENT
Start: 2023-04-27 | End: 2024-06-17

## 2023-04-27 ASSESSMENT — PATIENT HEALTH QUESTIONNAIRE - PHQ9
5. POOR APPETITE OR OVEREATING: NOT AT ALL
SUM OF ALL RESPONSES TO PHQ QUESTIONS 1-9: 0

## 2023-04-27 ASSESSMENT — ANXIETY QUESTIONNAIRES
3. WORRYING TOO MUCH ABOUT DIFFERENT THINGS: NOT AT ALL
5. BEING SO RESTLESS THAT IT IS HARD TO SIT STILL: NOT AT ALL
1. FEELING NERVOUS, ANXIOUS, OR ON EDGE: NOT AT ALL
2. NOT BEING ABLE TO STOP OR CONTROL WORRYING: NOT AT ALL
GAD7 TOTAL SCORE: 0
6. BECOMING EASILY ANNOYED OR IRRITABLE: NOT AT ALL
7. FEELING AFRAID AS IF SOMETHING AWFUL MIGHT HAPPEN: NOT AT ALL
GAD7 TOTAL SCORE: 0

## 2023-04-27 NOTE — PATIENT INSTRUCTIONS
Make lab only appointment fasting (nothing but water 10 hours prior to blood draw) at any [facility].

## 2023-04-27 NOTE — PROGRESS NOTES
Alison is a 42 year old  female who presents for annual exam.     Menses are regular q 90 days and normal lasting 4-5 days.  Menses flow: light.  No LMP recorded.. Using oral contraceptives for contraception.  She is not currently considering pregnancy.  Besides routine health maintenance, she has no other health concerns today .  Taking OCP and doing every 3-month withdrawal bleed.  Does note some spotting while doing this.  Otherwise has been able to get a massage especially on her left side her leg which is where her endometriosis seems to affect for the most.  Tries to time that with her menses.  Also notes endometriosis seems worse when she is stressed at work.  GYNECOLOGIC HISTORY:  Menarche:   Alison is sexually active with 1male partner(s) and is currently in monogamous relationship .    History sexually transmitted infections:HPV  STI testing offered?  Declined  CORBIN exposure: Unknown  History of abnormal Pap smear: YES - updated in Problem List and Health Maintenance accordingly  Family history of breast CA: No  Family history of uterine/ovarian CA: No    Family history of colon CA: No    HEALTH MAINTENANCE:  Cholesterol: (No results found for: CHOL History of abnormal lipids: Yes  Mammo: NA . History of abnormal Mammo: Not applicable.  Regular Self Breast Exams: No  Calcium/Vitamin D intake: source:  dairy, dietary supplement(s) Adequate? Yes  TSH: (  TSH   Date Value Ref Range Status   2021 1.26 0.40 - 4.00 mU/L Final    )  Pap; (No results found for: PAP )    HISTORY:  OB History    Para Term  AB Living   0 0 0 0 0 0   SAB IAB Ectopic Multiple Live Births   0 0 0 0 0     Past Medical History:   Diagnosis Date     Cervical high risk HPV (human papillomavirus) test positive     2021     Gastroesophageal reflux disease without esophagitis      Past Surgical History:   Procedure Laterality Date     DENTAL SURGERY      wisdom teeth     EYE SURGERY Right      Family History  "  Problem Relation Age of Onset     GERD Mother      Coronary Artery Disease Father         heart attack     Hyperlipidemia Father      Hyperlipidemia Sister      Hyperlipidemia Brother      Social History     Socioeconomic History     Marital status: Single   Occupational History     Occupation: pharmacy tech   Tobacco Use     Smoking status: Never     Smokeless tobacco: Never   Substance and Sexual Activity     Alcohol use: Yes     Drug use: Never     Sexual activity: Yes     Partners: Male     Birth control/protection: Pill       Current Outpatient Medications:      calcipotriene (DOVONOX) 0.005 % external solution, Apply topically as needed, Disp: , Rfl:      calcipotriene (DOVONOX) 0.005 % external solution, Apply topically as needed, Disp: , Rfl:      clobetasol (TEMOVATE) 0.05 % external ointment, as needed, Disp: , Rfl:      clobetasol (TEMOVATE) 0.05 % external solution, APPL YTO SKIN ON SCALP NIGHTLY. ROTATE WITH CALCIPOTRIENE, Disp: , Rfl:      norgestimate-ethinyl estradiol (ESTARYLLA) 0.25-35 MG-MCG tablet, TAKE 1 TABLET BY MOUTH DAILY. ACTIVE TABLETS ONLY FOR MENSES PREVENTION, Disp: 112 tablet, Rfl: 0     omeprazole (PRILOSEC) 20 MG DR capsule, TAKE 1 CAPSULE BY MOUTH EVERY DAY, Disp: , Rfl:    No Known Allergies    Past medical, surgical, social and family history were reviewed and updated in EPIC.    EXAM:  Ht 1.626 m (5' 4\")   Wt 55.7 kg (122 lb 11.2 oz)   BMI 21.06 kg/m     BMI: Body mass index is 21.06 kg/m .  Constitutional: healthy, alert and no distress  Head: Normocephalic. No masses, lesions, tenderness or abnormalities  Neck: Neck supple. Trachea midline. No adenopathy. Thyroid symmetric, normal size.   Cardiovascular: RRR.   Respiratory: Negative.   Breast: Breasts reveal mild symmetric fibrocystic densities, but there are no dominant, discrete, fixed or suspicious masses found.  Gastrointestinal: Abdomen soft, non-tender, non-distended. No masses, organomegaly.  :  Vulva:  No external " lesions, normal female hair distribution, no inguinal adenopathy.    Urethra:  Midline, non-tender, well supported, no discharge  Vagina:  Moist, pink, no abnormal discharge, no lesions  Uterus:  Normal size, more fixed to left side of abdomen, non-tender, freely mobile  Ovaries:  No masses appreciated, non-tender, mobile  Rectal Exam: deferred  Musculoskeletal: extremities normal  Skin: no suspicious lesions or rashes  Psychiatric: Affect appropriate, cooperative,mentation appears normal.     COUNSELING:   Reviewed preventive health counseling, as reflected in patient instructions       Contraception   reports that she has never smoked. She has never used smokeless tobacco.    Body mass index is 21.06 kg/m .    FRAX Risk Assessment    ASSESSMENT:  42 year old female with satisfactory annual exam  (Z01.419) Encounter for gynecological examination without abnormal finding  (primary encounter diagnosis)  Comment: OCP  Plan: norgestimate-ethinyl estradiol (ESTARYLLA)         0.25-35 MG-MCG tablet        Refill of OCP was done and discussed continuous use, does not need to have withdrawal bleed.  She will probably continue every 90 days    (N80.9) Endometriosis  Comment: Better on OCP  Plan: norgestimate-ethinyl estradiol (ESTARYLLA)         0.25-35 MG-MCG tablet        Discussed using until menopause.  Recommended exercise as she is doing with her cycles and continuing massage since it is helpful    (R87.810) Cervical high risk HPV (human papillomavirus) test positive  Comment: Last Pap Nil other high risk HPV  Plan: Pap diagnostic with HPV        Discussed if this Pap is also nil other high risk HPV, would need a repeat Pap smear in a year.  Reviewed recommendation would be for repeat colposcopy this year which she would decline.  If Pap has dysplasia noted, she would do a colposcopy most likely this year    (Z13.220) Screening for lipid disorders  Comment: Elevated 2 years ago  Plan: Lipid panel reflex to direct LDL  Fasting        Return to clinic fasting for lab    Gissel Pitt MD

## 2023-05-01 LAB
BKR LAB AP GYN ADEQUACY: NORMAL
BKR LAB AP GYN INTERPRETATION: NORMAL
BKR LAB AP HPV REFLEX: NORMAL
BKR LAB AP PREVIOUS ABNL DX: NORMAL
BKR LAB AP PREVIOUS ABNORMAL: NORMAL
PATH REPORT.COMMENTS IMP SPEC: NORMAL
PATH REPORT.COMMENTS IMP SPEC: NORMAL
PATH REPORT.RELEVANT HX SPEC: NORMAL

## 2023-05-03 LAB
HUMAN PAPILLOMA VIRUS 16 DNA: NEGATIVE
HUMAN PAPILLOMA VIRUS 18 DNA: NEGATIVE
HUMAN PAPILLOMA VIRUS FINAL DIAGNOSIS: NORMAL
HUMAN PAPILLOMA VIRUS OTHER HR: NEGATIVE

## 2024-03-09 ENCOUNTER — HEALTH MAINTENANCE LETTER (OUTPATIENT)
Age: 44
End: 2024-03-09

## 2024-06-17 DIAGNOSIS — Z01.419 ENCOUNTER FOR GYNECOLOGICAL EXAMINATION WITHOUT ABNORMAL FINDING: ICD-10-CM

## 2024-06-17 DIAGNOSIS — N80.9 ENDOMETRIOSIS: ICD-10-CM

## 2024-06-17 RX ORDER — NORGESTIMATE AND ETHINYL ESTRADIOL 0.25-0.035
KIT ORAL
Qty: 112 TABLET | Refills: 0 | Status: SHIPPED | OUTPATIENT
Start: 2024-06-17 | End: 2024-09-10

## 2024-06-19 ENCOUNTER — E-VISIT (OUTPATIENT)
Dept: OBGYN | Facility: CLINIC | Age: 44
End: 2024-06-19
Payer: COMMERCIAL

## 2024-06-19 DIAGNOSIS — B37.31 YEAST INFECTION OF THE VAGINA: Primary | ICD-10-CM

## 2024-06-19 PROCEDURE — 99421 OL DIG E/M SVC 5-10 MIN: CPT | Performed by: OBSTETRICS & GYNECOLOGY

## 2024-06-19 RX ORDER — FLUCONAZOLE 150 MG/1
150 TABLET ORAL
Qty: 3 TABLET | Refills: 0 | Status: SHIPPED | OUTPATIENT
Start: 2024-06-19 | End: 2024-06-26

## 2024-06-19 NOTE — PATIENT INSTRUCTIONS
Thank you for choosing us for your care. I have placed an order for a prescription so that you can start treatment. View your full visit summary for details by clicking on the link below. Your pharmacist will able to address any questions you may have about the medication.     If you re not feeling better within 2-3 days, please schedule an appointment.  You can schedule an appointment right here in [facility], or call [phone]  If the visit is for the same symptoms as your eVisit, we ll refund the cost of your eVisit if seen within seven days.

## 2024-07-27 ENCOUNTER — HEALTH MAINTENANCE LETTER (OUTPATIENT)
Age: 44
End: 2024-07-27

## 2025-03-25 ENCOUNTER — MYC MEDICAL ADVICE (OUTPATIENT)
Dept: OBGYN | Facility: CLINIC | Age: 45
End: 2025-03-25
Payer: COMMERCIAL

## 2025-04-08 ENCOUNTER — OFFICE VISIT (OUTPATIENT)
Dept: OBGYN | Facility: CLINIC | Age: 45
End: 2025-04-08
Payer: COMMERCIAL

## 2025-04-08 VITALS
BODY MASS INDEX: 22.11 KG/M2 | OXYGEN SATURATION: 99 % | SYSTOLIC BLOOD PRESSURE: 120 MMHG | HEIGHT: 64 IN | HEART RATE: 69 BPM | DIASTOLIC BLOOD PRESSURE: 79 MMHG | WEIGHT: 129.5 LBS

## 2025-04-08 DIAGNOSIS — Z13.220 SCREENING FOR LIPID DISORDERS: ICD-10-CM

## 2025-04-08 DIAGNOSIS — Z13.0 SCREENING, ANEMIA, DEFICIENCY, IRON: ICD-10-CM

## 2025-04-08 DIAGNOSIS — N80.9 ENDOMETRIOSIS: ICD-10-CM

## 2025-04-08 DIAGNOSIS — Z13.29 SCREENING FOR THYROID DISORDER: ICD-10-CM

## 2025-04-08 DIAGNOSIS — R53.83 FATIGUE, UNSPECIFIED TYPE: ICD-10-CM

## 2025-04-08 DIAGNOSIS — B37.31 YEAST INFECTION OF THE VAGINA: ICD-10-CM

## 2025-04-08 DIAGNOSIS — Z13.1 SCREENING FOR DIABETES MELLITUS: ICD-10-CM

## 2025-04-08 DIAGNOSIS — Z12.4 PAP SMEAR FOR CERVICAL CANCER SCREENING: ICD-10-CM

## 2025-04-08 DIAGNOSIS — Z01.419 ENCOUNTER FOR GYNECOLOGICAL EXAMINATION WITHOUT ABNORMAL FINDING: ICD-10-CM

## 2025-04-08 DIAGNOSIS — Z11.3 ROUTINE SCREENING FOR STI (SEXUALLY TRANSMITTED INFECTION): ICD-10-CM

## 2025-04-08 PROCEDURE — 87491 CHLMYD TRACH DNA AMP PROBE: CPT | Performed by: OBSTETRICS & GYNECOLOGY

## 2025-04-08 PROCEDURE — 87591 N.GONORRHOEAE DNA AMP PROB: CPT | Performed by: OBSTETRICS & GYNECOLOGY

## 2025-04-08 RX ORDER — NORGESTIMATE AND ETHINYL ESTRADIOL 0.25-0.035
1 KIT ORAL DAILY
Qty: 112 TABLET | Refills: 4 | Status: SHIPPED | OUTPATIENT
Start: 2025-04-08

## 2025-04-08 RX ORDER — FLUCONAZOLE 150 MG/1
150 TABLET ORAL
Qty: 3 TABLET | Refills: 0 | Status: SHIPPED | OUTPATIENT
Start: 2025-04-08

## 2025-04-08 NOTE — PROGRESS NOTES
"Alison is a 44 year old  female who presents for annual exam.     Menses are regular q 90 days and normal and crampy lasting  5  days.  Menses flow: normal.  Patient's last menstrual period was 2025.. Using oral contraceptives for contraception.  She is not currently considering pregnancy.  Besides routine health maintenance, she has no other health concerns today . Has been doing well on OCP but lots of spotting and taking active tablets on and off.  Like if has spotting will skip a day. Usually stops every 90 days but then will take 2-4 days to get menses. Has felt very fatigued and thinks it's due to iron deficiency anemia. Took one iron tablet and felt better the next day.   GYNECOLOGIC HISTORY:  Menarche:   Alison is sexually active with 1male partner(s) and is currently in monogamous relationship.    History sexually transmitted infections:HPV  STI testing offered?  yes  CORBIN exposure: Unknown  History of abnormal Pap smear: No - age 30- 64 PAP with HPV every 5 years recommended  Family history of breast CA: No  Family history of uterine/ovarian CA: No    Family history of colon CA: No    HEALTH MAINTENANCE:  Cholesterol: (No results found for: \"CHOL\" History of abnormal lipids: Yes  Mammo: na . History of abnormal Mammo: Not applicable.  Regular Self Breast Exams: No  Calcium/Vitamin D intake: source:  none Adequate? Yes  TSH: (  TSH   Date Value Ref Range Status   2021 1.26 0.40 - 4.00 mU/L Final    )  Pap; (No results found for: \"PAP\" )    HISTORY:  OB History    Para Term  AB Living   0 0 0 0 0 0   SAB IAB Ectopic Multiple Live Births   0 0 0 0 0     Past Medical History:   Diagnosis Date    Cervical high risk HPV (human papillomavirus) test positive     2021    Gastroesophageal reflux disease without esophagitis      Past Surgical History:   Procedure Laterality Date    DENTAL SURGERY      wisdom teeth    EYE SURGERY Right      Family History   Problem Relation Age " "of Onset    GERD Mother     Coronary Artery Disease Father         heart attack    Hyperlipidemia Father     Hyperlipidemia Sister     Hyperlipidemia Brother      Social History     Socioeconomic History    Marital status: Single   Occupational History    Occupation: pharmacy tech   Tobacco Use    Smoking status: Never    Smokeless tobacco: Never   Substance and Sexual Activity    Alcohol use: Yes    Drug use: Never    Sexual activity: Yes     Partners: Male     Birth control/protection: Pill       Current Outpatient Medications:     fluconazole (DIFLUCAN) 150 MG tablet, Take 1 tablet (150 mg) by mouth every 3 days., Disp: 3 tablet, Rfl: 0    norgestimate-ethinyl estradiol (ESTARYLLA) 0.25-35 MG-MCG tablet, Take 1 tablet by mouth daily. Active tablets only for prevention of menses, Disp: 112 tablet, Rfl: 4    calcipotriene (DOVONOX) 0.005 % external solution, Apply topically as needed, Disp: , Rfl:     calcipotriene (DOVONOX) 0.005 % external solution, Apply topically as needed, Disp: , Rfl:     clobetasol (TEMOVATE) 0.05 % external ointment, as needed, Disp: , Rfl:     clobetasol (TEMOVATE) 0.05 % external solution, APPL YTO SKIN ON SCALP NIGHTLY. ROTATE WITH CALCIPOTRIENE, Disp: , Rfl:     omeprazole (PRILOSEC) 20 MG DR capsule, TAKE 1 CAPSULE BY MOUTH EVERY DAY, Disp: , Rfl:    No Known Allergies    Past medical, surgical, social and family history were reviewed and updated in EPIC.    EXAM:  /79   Pulse 69   Ht 1.626 m (5' 4\")   Wt 58.7 kg (129 lb 8 oz)   LMP 03/20/2025   SpO2 99%   BMI 22.23 kg/m     BMI: Body mass index is 22.23 kg/m .  Constitutional: healthy, alert and no distress  Head: Normocephalic. No masses, lesions, tenderness or abnormalities  Neck: Neck supple. Trachea midline. No adenopathy. Thyroid symmetric, normal size.   Cardiovascular: RRR.   Respiratory: Negative.   Breast: No nodularity, asymmetry or nipple discharge bilaterally.  Gastrointestinal: Abdomen soft, non-tender, " non-distended. No masses, organomegaly.  :  Vulva:  No external lesions, normal female hair distribution, no inguinal adenopathy.    Urethra:  Midline, non-tender, well supported, no discharge  Vagina:  Moist, pink, no abnormal discharge, no lesions  Uterus:  scarred to pt left side, Normal size , non-tender  Ovaries:  No masses appreciated, non-tender, mobile  Rectal Exam: deferred  Musculoskeletal: extremities normal  Skin: no suspicious lesions or rashes  Psychiatric: Affect appropriate, cooperative,mentation appears normal.     COUNSELING:   Reviewed preventive health counseling, as reflected in patient instructions   reports that she has never smoked. She has never used smokeless tobacco.    Body mass index is 22.23 kg/m .    FRAX Risk Assessment    ASSESSMENT:  44 year old female with satisfactory annual exam  (Z01.419) Encounter for gynecological examination without abnormal finding  (primary encounter diagnosis)  Comment: endometriosis  Plan: norgestimate-ethinyl estradiol (ESTARYLLA)         0.25-35 MG-MCG tablet        Continue on OCP until menopause or age 57 to keep endometriosis stable. Discussed should not be progressing on cycle control. Continuous use discussed and should not skip active tablets with brown spotting.  Discussed stop with red bleeding.   (N80.9) Endometriosis  Comment: stable  Plan: norgestimate-ethinyl estradiol (ESTARYLLA)         0.25-35 MG-MCG tablet        Refill done    (B37.31) Yeast infection of the vagina  Comment: sometimes will get with travel  Plan: fluconazole (DIFLUCAN) 150 MG tablet        Refill done    (Z13.0) Screening, anemia, deficiency, iron  Plan: CBC with platelets, Iron and iron binding         capacity        Return to clinic for lab. Discussed should not have iron deficiency anemia since not having menstrual cycles.     (Z12.4) Pap smear for cervical cancer screening  Comment: h/o abnormal pap  Plan: HPV and Gynecologic Cytology Panel -         Recommended  Age 30-65 Years, Gynecologic         Cytology (PAP)        Pap done today at patient request. Discussed if normal and HPV negative plan repeat pap in 3 years.     (Z13.220) Screening for lipid disorders  Plan: Lipid panel reflex to direct LDL Fasting        RTC fasting for lab    (Z13.1) Screening for diabetes mellitus  Plan: Comprehensive metabolic panel (BMP + Alb, Alk         Phos, ALT, AST, Total. Bili, TP), Hemoglobin         A1c        RTC for lab    (Z13.29) Screening for thyroid disorder  Plan: TSH with free T4 reflex        RTC for lab    (Z11.3) Routine screening for STI (sexually transmitted infection)  Comment: pt request  Plan: Treponema Abs w Reflex to RPR and Titer, HIV         Antigen Antibody Combo Cascade, Hepatitis B         surface antigen, Hepatitis C antibody,         Chlamydia trachomatis/Neisseria gonorrhoeae by         PCR        Will let her know results when available.     (R53.83) Fatigue, unspecified type  Plan: Vitamin B12, Vitamin D Deficiency, Ferritin        RTC for labs    Gissel Pitt MD

## 2025-04-08 NOTE — PATIENT INSTRUCTIONS
Make lab only appointment fasting (nothing but water 10 hours prior to blood draw) at any [facility].

## 2025-04-09 LAB
C TRACH DNA SPEC QL PROBE+SIG AMP: NEGATIVE
HPV HR 12 DNA CVX QL NAA+PROBE: NEGATIVE
HPV16 DNA CVX QL NAA+PROBE: NEGATIVE
HPV18 DNA CVX QL NAA+PROBE: NEGATIVE
HUMAN PAPILLOMA VIRUS FINAL DIAGNOSIS: NORMAL
N GONORRHOEA DNA SPEC QL NAA+PROBE: NEGATIVE
SPECIMEN TYPE: NORMAL

## 2025-04-15 ENCOUNTER — LAB (OUTPATIENT)
Dept: LAB | Facility: CLINIC | Age: 45
End: 2025-04-15
Payer: COMMERCIAL

## 2025-04-15 DIAGNOSIS — Z13.29 SCREENING FOR THYROID DISORDER: ICD-10-CM

## 2025-04-15 DIAGNOSIS — R53.83 FATIGUE, UNSPECIFIED TYPE: ICD-10-CM

## 2025-04-15 DIAGNOSIS — Z13.220 SCREENING FOR LIPID DISORDERS: ICD-10-CM

## 2025-04-15 DIAGNOSIS — Z13.0 SCREENING, ANEMIA, DEFICIENCY, IRON: ICD-10-CM

## 2025-04-15 DIAGNOSIS — Z11.3 ROUTINE SCREENING FOR STI (SEXUALLY TRANSMITTED INFECTION): ICD-10-CM

## 2025-04-15 DIAGNOSIS — Z13.1 SCREENING FOR DIABETES MELLITUS: ICD-10-CM

## 2025-04-15 LAB
ALBUMIN SERPL BCG-MCNC: 4.2 G/DL (ref 3.5–5.2)
ALP SERPL-CCNC: 36 U/L (ref 40–150)
ALT SERPL W P-5'-P-CCNC: 11 U/L (ref 0–50)
ANION GAP SERPL CALCULATED.3IONS-SCNC: 11 MMOL/L (ref 7–15)
AST SERPL W P-5'-P-CCNC: 16 U/L (ref 0–45)
BILIRUB SERPL-MCNC: 0.3 MG/DL
BUN SERPL-MCNC: 9.9 MG/DL (ref 6–20)
CALCIUM SERPL-MCNC: 9.3 MG/DL (ref 8.8–10.4)
CHLORIDE SERPL-SCNC: 102 MMOL/L (ref 98–107)
CHOLEST SERPL-MCNC: 269 MG/DL
CREAT SERPL-MCNC: 0.64 MG/DL (ref 0.51–0.95)
EGFRCR SERPLBLD CKD-EPI 2021: >90 ML/MIN/1.73M2
ERYTHROCYTE [DISTWIDTH] IN BLOOD BY AUTOMATED COUNT: 12.2 % (ref 10–15)
EST. AVERAGE GLUCOSE BLD GHB EST-MCNC: 108 MG/DL
FASTING STATUS PATIENT QL REPORTED: YES
FASTING STATUS PATIENT QL REPORTED: YES
FERRITIN SERPL-MCNC: 26 NG/ML (ref 6–175)
GLUCOSE SERPL-MCNC: 77 MG/DL (ref 70–99)
HBA1C MFR BLD: 5.4 % (ref 0–5.6)
HBV SURFACE AG SERPL QL IA: NONREACTIVE
HCO3 SERPL-SCNC: 24 MMOL/L (ref 22–29)
HCT VFR BLD AUTO: 39.3 % (ref 35–47)
HCV AB SERPL QL IA: NONREACTIVE
HDLC SERPL-MCNC: 70 MG/DL
HGB BLD-MCNC: 12.9 G/DL (ref 11.7–15.7)
HIV 1+2 AB+HIV1 P24 AG SERPL QL IA: NONREACTIVE
IRON BINDING CAPACITY (ROCHE): 417 UG/DL (ref 240–430)
IRON SATN MFR SERPL: 19 % (ref 15–46)
IRON SERPL-MCNC: 81 UG/DL (ref 37–145)
LDLC SERPL CALC-MCNC: 163 MG/DL
MCH RBC QN AUTO: 27.3 PG (ref 26.5–33)
MCHC RBC AUTO-ENTMCNC: 32.8 G/DL (ref 31.5–36.5)
MCV RBC AUTO: 83 FL (ref 78–100)
NONHDLC SERPL-MCNC: 199 MG/DL
PLATELET # BLD AUTO: 308 10E3/UL (ref 150–450)
POTASSIUM SERPL-SCNC: 4 MMOL/L (ref 3.4–5.3)
PROT SERPL-MCNC: 7.4 G/DL (ref 6.4–8.3)
RBC # BLD AUTO: 4.72 10E6/UL (ref 3.8–5.2)
SODIUM SERPL-SCNC: 137 MMOL/L (ref 135–145)
T PALLIDUM AB SER QL: NONREACTIVE
TRIGL SERPL-MCNC: 180 MG/DL
TSH SERPL DL<=0.005 MIU/L-ACNC: 1.51 UIU/ML (ref 0.3–4.2)
VIT B12 SERPL-MCNC: 305 PG/ML (ref 232–1245)
VIT D+METAB SERPL-MCNC: 42 NG/ML (ref 20–50)
WBC # BLD AUTO: 6.3 10E3/UL (ref 4–11)

## 2025-04-15 PROCEDURE — 80053 COMPREHEN METABOLIC PANEL: CPT

## 2025-04-15 PROCEDURE — 86803 HEPATITIS C AB TEST: CPT

## 2025-04-15 PROCEDURE — 82607 VITAMIN B-12: CPT

## 2025-04-15 PROCEDURE — 86780 TREPONEMA PALLIDUM: CPT

## 2025-04-15 PROCEDURE — 82306 VITAMIN D 25 HYDROXY: CPT

## 2025-04-15 PROCEDURE — 36415 COLL VENOUS BLD VENIPUNCTURE: CPT

## 2025-04-15 PROCEDURE — 83540 ASSAY OF IRON: CPT

## 2025-04-15 PROCEDURE — 87340 HEPATITIS B SURFACE AG IA: CPT

## 2025-04-15 PROCEDURE — 85027 COMPLETE CBC AUTOMATED: CPT

## 2025-04-15 PROCEDURE — 82728 ASSAY OF FERRITIN: CPT

## 2025-04-15 PROCEDURE — 87389 HIV-1 AG W/HIV-1&-2 AB AG IA: CPT

## 2025-04-15 PROCEDURE — 83036 HEMOGLOBIN GLYCOSYLATED A1C: CPT

## 2025-04-15 PROCEDURE — 83550 IRON BINDING TEST: CPT

## 2025-04-15 PROCEDURE — 84443 ASSAY THYROID STIM HORMONE: CPT

## 2025-04-15 PROCEDURE — 80061 LIPID PANEL: CPT

## 2025-05-15 SDOH — HEALTH STABILITY: PHYSICAL HEALTH: ON AVERAGE, HOW MANY MINUTES DO YOU ENGAGE IN EXERCISE AT THIS LEVEL?: 30 MIN

## 2025-05-15 SDOH — HEALTH STABILITY: PHYSICAL HEALTH: ON AVERAGE, HOW MANY DAYS PER WEEK DO YOU ENGAGE IN MODERATE TO STRENUOUS EXERCISE (LIKE A BRISK WALK)?: 2 DAYS

## 2025-05-16 ENCOUNTER — OFFICE VISIT (OUTPATIENT)
Dept: ORTHOPEDICS | Facility: CLINIC | Age: 45
End: 2025-05-16
Payer: COMMERCIAL

## 2025-05-16 ENCOUNTER — ANCILLARY PROCEDURE (OUTPATIENT)
Dept: GENERAL RADIOLOGY | Facility: CLINIC | Age: 45
End: 2025-05-16
Attending: STUDENT IN AN ORGANIZED HEALTH CARE EDUCATION/TRAINING PROGRAM
Payer: COMMERCIAL

## 2025-05-16 DIAGNOSIS — M25.571 ACUTE RIGHT ANKLE PAIN: ICD-10-CM

## 2025-05-16 DIAGNOSIS — S93.491A SPRAIN OF ANTERIOR TALOFIBULAR LIGAMENT OF RIGHT ANKLE, INITIAL ENCOUNTER: ICD-10-CM

## 2025-05-16 DIAGNOSIS — M25.571 ACUTE RIGHT ANKLE PAIN: Primary | ICD-10-CM

## 2025-05-16 PROCEDURE — 99203 OFFICE O/P NEW LOW 30 MIN: CPT | Performed by: STUDENT IN AN ORGANIZED HEALTH CARE EDUCATION/TRAINING PROGRAM

## 2025-05-16 PROCEDURE — 73610 X-RAY EXAM OF ANKLE: CPT | Mod: TC | Performed by: RADIOLOGY

## 2025-05-16 NOTE — PROGRESS NOTES
ASSESSMENT & PLAN    Alison was seen today for pain.    Diagnoses and all orders for this visit:    Acute right ankle pain  -     XR Ankle RT G/E 3 vw; Future    Sprain of anterior talofibular ligament of right ankle, initial encounter      This issue is acute and Improving. Alisno presents our clinic today to discuss her acute ankle pain.  Reports suffering an inversion ankle injury about 2 months ago when tripping while going down some stairs.  She initially had some mild pain and swelling in the lateral aspect of the ankle that is overall improved, however more recently she was traveling and not wearing supportive shoes and doing a lot of activity and had some recurrence of pain in the lateral aspect of the ankle.  Radiographs taken in clinic today reviewed with the patient and were unremarkable for any evidence of an old fracture, significant degenerative changes, or any acute abnormalities.  On examination, the patient has some mild tenderness to palpation over the ATFL but otherwise has full range of motion of the ankle and 5/5 strength in all planes without pain.  She has no pain with talar tilt or reverse talar tilt testing and no ligamentous laxity with drawer testing.  These findings are very reassuring against any underlying structural injury.  We discussed that her symptoms should continue to improve with time, although she may benefit from some physical therapy to help strengthen the muscles around the ankle after her injury, however the patient wishes to defer this at this time.  We determined the following plan:  - She can continue to wear supportive shoe wear when active  - She can utilize over-the-counter pain medicines as needed  - She can utilize ice as needed for pain  - She can follow-up with me as needed if her symptoms fail to improve or worsen      Mario Majano DO  [facility]    -----  Chief Complaint   Patient presents with    Right Ankle -  Pain       SUBJECTIVE  Alison Smith is a/an 44 year old female who is seen as a self referral for evaluation of right ankle pain.     The patient is seen by themselves.    Onset: 2 month(s) ago. Patient describes injury as walking down the stairs, had a mis-step and fell with an inversion mechanism  Location of Pain: left anterior ankle - points towards anterior talofibular location  Worsened by: prolonged walking, unsupportive shoes  Better with: comfortable shoes, supportive shoes (currently wearing Silvestre),   Treatments tried: elevation, ice, compression sock, and changing to more supportive shoes  Associated symptoms: swelling and occasional tingling    Orthopedic/Surgical history: NO  Social History/Occupation: Pharmacy tech; enjoys being active and traveling      REVIEW OF SYSTEMS:  Review of systems negative unless mentioned in HPI     OBJECTIVE:  LMP 03/20/2025    General: healthy, alert and in no distress  Skin: no suspicious lesions or rash.  CV: distal perfusion intact   Resp: normal respiratory effort without conversational dyspnea   Psych: normal mood and affect  Gait: NORMAL  Neuro: Normal light sensory exam of RL extremity     Right Foot and Ankle exam  Gait: Normal  Alignment: Normal  Inspection: [x] Normal  [] Swelling present  [] Ecchymosis present []Other   Tenderness: Mild TTP ATFL. No other bony tenderness of forefoot, midfoot, hindfoot noted. No tenderness over either malleolus.   Range of motion (ankle):   Plantarflexion:Full Dorsiflexion: Full  Inversion: Full  Eversion: Full  Strength:   Plantarflexion: 5/5 Dorsiflexion: 5/5  Internal rotation: 5/5 External rotation 5/5  Neurologic: Normal sensation   Vascular: Normal pulses   Special tests:   Villalobos: Negative  Syndesmotic squeeze test: Negative  Anterior drawer: Negative  Dorsiflexion/eversion: No pain   Talar tilt: Negative    Calcaneal squeeze: Negative    Other tests: None  Contralateral foot and ankle grossly normal in terms of  appearance, range of motion, and strength       RADIOLOGY:  Final results and radiologist's interpretation, available in the [facility] health record.  Images were reviewed with the patient in the office today.  My personal interpretation of the performed imaging: Normal joint spacing and alignment of the ankle.  No acute bony abnormalities.

## 2025-05-16 NOTE — LETTER
5/16/2025      Alison Smith  [address]  [place] [address]      Dear Colleague,    Thank you for referring your patient, Alison Smith, to the [facility]. Please see a copy of my visit note below.    ASSESSMENT & PLAN    Alison was seen today for pain.    Diagnoses and all orders for this visit:    Acute right ankle pain  -     XR Ankle RT G/E 3 vw; Future    Sprain of anterior talofibular ligament of right ankle, initial encounter      This issue is acute and Improving. Alison presents our clinic today to discuss her acute ankle pain.  Reports suffering an inversion ankle injury about 2 months ago when tripping while going down some stairs.  She initially had some mild pain and swelling in the lateral aspect of the ankle that is overall improved, however more recently she was traveling and not wearing supportive shoes and doing a lot of activity and had some recurrence of pain in the lateral aspect of the ankle.  Radiographs taken in clinic today reviewed with the patient and were unremarkable for any evidence of an old fracture, significant degenerative changes, or any acute abnormalities.  On examination, the patient has some mild tenderness to palpation over the ATFL but otherwise has full range of motion of the ankle and 5/5 strength in all planes without pain.  She has no pain with talar tilt or reverse talar tilt testing and no ligamentous laxity with drawer testing.  These findings are very reassuring against any underlying structural injury.  We discussed that her symptoms should continue to improve with time, although she may benefit from some physical therapy to help strengthen the muscles around the ankle after her injury, however the patient wishes to defer this at this time.  We determined the following plan:  - She can continue to wear supportive shoe wear when active  - She can utilize over-the-counter pain medicines as needed  - She can utilize  ice as needed for pain  - She can follow-up with me as needed if her symptoms fail to improve or worsen      DO BONIFACIO [facility]    -----  Chief Complaint   Patient presents with     Right Ankle - Pain       SUBJECTIVE  Alisonsharri Smith is a/an 44 year old female who is seen as a self referral for evaluation of right ankle pain.     The patient is seen by themselves.    Onset: 2 month(s) ago. Patient describes injury as walking down the stairs, had a mis-step and fell with an inversion mechanism  Location of Pain: left anterior ankle - points towards anterior talofibular location  Worsened by: prolonged walking, unsupportive shoes  Better with: comfortable shoes, supportive shoes (currently wearing Silvestre),   Treatments tried: elevation, ice, compression sock, and changing to more supportive shoes  Associated symptoms: swelling and occasional tingling    Orthopedic/Surgical history: NO  Social History/Occupation: Pharmacy tech; enjoys being active and traveling      REVIEW OF SYSTEMS:  Review of systems negative unless mentioned in HPI     OBJECTIVE:  LMP 03/20/2025    General: healthy, alert and in no distress  Skin: no suspicious lesions or rash.  CV: distal perfusion intact   Resp: normal respiratory effort without conversational dyspnea   Psych: normal mood and affect  Gait: NORMAL  Neuro: Normal light sensory exam of RL extremity     Right Foot and Ankle exam  Gait: Normal  Alignment: Normal  Inspection: [x] Normal  [] Swelling present  [] Ecchymosis present []Other   Tenderness: Mild TTP ATFL. No other bony tenderness of forefoot, midfoot, hindfoot noted. No tenderness over either malleolus.   Range of motion (ankle):   Plantarflexion:Full Dorsiflexion: Full  Inversion: Full  Eversion: Full  Strength:   Plantarflexion: 5/5 Dorsiflexion: 5/5  Internal rotation: 5/5 External rotation 5/5  Neurologic: Normal sensation   Vascular: Normal pulses   Special  tests:   Villalobos: Negative  Syndesmotic squeeze test: Negative  Anterior drawer: Negative  Dorsiflexion/eversion: No pain   Talar tilt: Negative    Calcaneal squeeze: Negative    Other tests: None  Contralateral foot and ankle grossly normal in terms of appearance, range of motion, and strength       RADIOLOGY:  Final results and radiologist's interpretation, available in the [facility] health record.  Images were reviewed with the patient in the office today.  My personal interpretation of the performed imaging: Normal joint spacing and alignment of the ankle.  No acute bony abnormalities.           Again, thank you for allowing me to participate in the care of your patient.        Sincerely,        Mario Majano, DO    Electronically signed

## 2025-08-18 ENCOUNTER — MYC MEDICAL ADVICE (OUTPATIENT)
Dept: OBGYN | Facility: CLINIC | Age: 45
End: 2025-08-18
Payer: COMMERCIAL